# Patient Record
Sex: FEMALE | Race: WHITE | NOT HISPANIC OR LATINO
[De-identification: names, ages, dates, MRNs, and addresses within clinical notes are randomized per-mention and may not be internally consistent; named-entity substitution may affect disease eponyms.]

---

## 2023-10-06 PROBLEM — Z00.00 ENCOUNTER FOR PREVENTIVE HEALTH EXAMINATION: Status: ACTIVE | Noted: 2023-10-06

## 2023-10-19 ENCOUNTER — APPOINTMENT (OUTPATIENT)
Dept: ANTEPARTUM | Facility: CLINIC | Age: 33
End: 2023-10-19

## 2024-03-29 ENCOUNTER — APPOINTMENT (OUTPATIENT)
Dept: ANTEPARTUM | Facility: CLINIC | Age: 34
End: 2024-03-29
Payer: COMMERCIAL

## 2024-03-29 ENCOUNTER — ASOB RESULT (OUTPATIENT)
Age: 34
End: 2024-03-29

## 2024-03-29 PROCEDURE — 76816 OB US FOLLOW-UP PER FETUS: CPT

## 2024-03-29 PROCEDURE — 76819 FETAL BIOPHYS PROFIL W/O NST: CPT

## 2024-03-29 PROCEDURE — 76820 UMBILICAL ARTERY ECHO: CPT | Mod: 59

## 2024-04-24 ENCOUNTER — ASOB RESULT (OUTPATIENT)
Age: 34
End: 2024-04-24

## 2024-04-24 ENCOUNTER — APPOINTMENT (OUTPATIENT)
Dept: ANTEPARTUM | Facility: CLINIC | Age: 34
End: 2024-04-24
Payer: COMMERCIAL

## 2024-04-24 PROCEDURE — 76816 OB US FOLLOW-UP PER FETUS: CPT

## 2024-04-24 PROCEDURE — 76819 FETAL BIOPHYS PROFIL W/O NST: CPT

## 2024-04-24 PROCEDURE — 76820 UMBILICAL ARTERY ECHO: CPT | Mod: 59

## 2024-05-08 ENCOUNTER — TRANSCRIPTION ENCOUNTER (OUTPATIENT)
Age: 34
End: 2024-05-08

## 2024-05-18 ENCOUNTER — OUTPATIENT (OUTPATIENT)
Dept: OUTPATIENT SERVICES | Facility: HOSPITAL | Age: 34
LOS: 1 days | End: 2024-05-18
Payer: COMMERCIAL

## 2024-05-18 DIAGNOSIS — Z01.818 ENCOUNTER FOR OTHER PREPROCEDURAL EXAMINATION: ICD-10-CM

## 2024-05-18 PROCEDURE — 85027 COMPLETE CBC AUTOMATED: CPT

## 2024-05-18 PROCEDURE — 85610 PROTHROMBIN TIME: CPT

## 2024-05-18 PROCEDURE — 85730 THROMBOPLASTIN TIME PARTIAL: CPT

## 2024-05-18 PROCEDURE — 86780 TREPONEMA PALLIDUM: CPT

## 2024-05-19 ENCOUNTER — TRANSCRIPTION ENCOUNTER (OUTPATIENT)
Age: 34
End: 2024-05-19

## 2024-05-19 LAB
APTT BLD: 28.3 SEC — SIGNIFICANT CHANGE UP (ref 24.5–35.6)
HCT VFR BLD CALC: 34.2 % — LOW (ref 34.5–45)
HGB BLD-MCNC: 11 G/DL — LOW (ref 11.5–15.5)
INR BLD: 0.93 RATIO — SIGNIFICANT CHANGE UP (ref 0.85–1.18)
MCHC RBC-ENTMCNC: 29.8 PG — SIGNIFICANT CHANGE UP (ref 27–34)
MCHC RBC-ENTMCNC: 32.2 GM/DL — SIGNIFICANT CHANGE UP (ref 32–36)
MCV RBC AUTO: 92.7 FL — SIGNIFICANT CHANGE UP (ref 80–100)
PLATELET # BLD AUTO: 320 K/UL — SIGNIFICANT CHANGE UP (ref 150–400)
PROTHROM AB SERPL-ACNC: 10.5 SEC — SIGNIFICANT CHANGE UP (ref 9.5–13)
RBC # BLD: 3.69 M/UL — LOW (ref 3.8–5.2)
RBC # FLD: 13.8 % — SIGNIFICANT CHANGE UP (ref 10.3–14.5)
T PALLIDUM AB TITR SER: NEGATIVE — SIGNIFICANT CHANGE UP
WBC # BLD: 9.45 K/UL — SIGNIFICANT CHANGE UP (ref 3.8–10.5)
WBC # FLD AUTO: 9.45 K/UL — SIGNIFICANT CHANGE UP (ref 3.8–10.5)

## 2024-05-20 ENCOUNTER — INPATIENT (INPATIENT)
Facility: HOSPITAL | Age: 34
LOS: 2 days | Discharge: HOME | End: 2024-05-23
Attending: OBSTETRICS & GYNECOLOGY | Admitting: OBSTETRICS & GYNECOLOGY
Payer: COMMERCIAL

## 2024-05-20 VITALS
SYSTOLIC BLOOD PRESSURE: 108 MMHG | WEIGHT: 197.09 LBS | RESPIRATION RATE: 18 BRPM | HEIGHT: 62 IN | OXYGEN SATURATION: 100 % | TEMPERATURE: 98 F | HEART RATE: 104 BPM | DIASTOLIC BLOOD PRESSURE: 70 MMHG

## 2024-05-20 DIAGNOSIS — Z90.89 ACQUIRED ABSENCE OF OTHER ORGANS: Chronic | ICD-10-CM

## 2024-05-20 DIAGNOSIS — K08.409 PARTIAL LOSS OF TEETH, UNSPECIFIED CAUSE, UNSPECIFIED CLASS: Chronic | ICD-10-CM

## 2024-05-20 LAB
BLD GP AB SCN SERPL QL: NEGATIVE — SIGNIFICANT CHANGE UP
RH IG SCN BLD-IMP: POSITIVE — SIGNIFICANT CHANGE UP

## 2024-05-20 PROCEDURE — 88307 TISSUE EXAM BY PATHOLOGIST: CPT | Mod: 26

## 2024-05-20 DEVICE — SEPRAFILM 5 X 6": Type: IMPLANTABLE DEVICE | Status: FUNCTIONAL

## 2024-05-20 RX ORDER — PROPRANOLOL HCL 160 MG
1 CAPSULE, EXTENDED RELEASE 24HR ORAL
Refills: 0 | DISCHARGE

## 2024-05-20 RX ORDER — IBUPROFEN 200 MG
600 TABLET ORAL EVERY 6 HOURS
Refills: 0 | Status: COMPLETED | OUTPATIENT
Start: 2024-05-20 | End: 2025-04-18

## 2024-05-20 RX ORDER — OXYCODONE HYDROCHLORIDE 5 MG/1
5 TABLET ORAL
Refills: 0 | Status: COMPLETED | OUTPATIENT
Start: 2024-05-20 | End: 2024-05-27

## 2024-05-20 RX ORDER — ONDANSETRON 8 MG/1
4 TABLET, FILM COATED ORAL EVERY 6 HOURS
Refills: 0 | Status: DISCONTINUED | OUTPATIENT
Start: 2024-05-20 | End: 2024-05-20

## 2024-05-20 RX ORDER — ZINC OXIDE 200 MG/G
1 OINTMENT TOPICAL EVERY 6 HOURS
Refills: 0 | Status: DISCONTINUED | OUTPATIENT
Start: 2024-05-20 | End: 2024-05-23

## 2024-05-20 RX ORDER — TETANUS TOXOID, REDUCED DIPHTHERIA TOXOID AND ACELLULAR PERTUSSIS VACCINE, ADSORBED 5; 2.5; 8; 8; 2.5 [IU]/.5ML; [IU]/.5ML; UG/.5ML; UG/.5ML; UG/.5ML
0.5 SUSPENSION INTRAMUSCULAR ONCE
Refills: 0 | Status: DISCONTINUED | OUTPATIENT
Start: 2024-05-20 | End: 2024-05-23

## 2024-05-20 RX ORDER — OXYTOCIN 10 UNIT/ML
333.33 VIAL (ML) INJECTION
Qty: 20 | Refills: 0 | Status: DISCONTINUED | OUTPATIENT
Start: 2024-05-20 | End: 2024-05-20

## 2024-05-20 RX ORDER — SIMETHICONE 80 MG/1
80 TABLET, CHEWABLE ORAL EVERY 4 HOURS
Refills: 0 | Status: DISCONTINUED | OUTPATIENT
Start: 2024-05-20 | End: 2024-05-23

## 2024-05-20 RX ORDER — SODIUM CHLORIDE 9 MG/ML
1000 INJECTION, SOLUTION INTRAVENOUS
Refills: 0 | Status: DISCONTINUED | OUTPATIENT
Start: 2024-05-20 | End: 2024-05-20

## 2024-05-20 RX ORDER — LANOLIN
1 OINTMENT (GRAM) TOPICAL EVERY 6 HOURS
Refills: 0 | Status: DISCONTINUED | OUTPATIENT
Start: 2024-05-20 | End: 2024-05-23

## 2024-05-20 RX ORDER — DIPHENHYDRAMINE HCL 50 MG
25 CAPSULE ORAL EVERY 6 HOURS
Refills: 0 | Status: COMPLETED | OUTPATIENT
Start: 2024-05-20 | End: 2025-04-18

## 2024-05-20 RX ORDER — DEXAMETHASONE 0.5 MG/5ML
4 ELIXIR ORAL EVERY 6 HOURS
Refills: 0 | Status: DISCONTINUED | OUTPATIENT
Start: 2024-05-20 | End: 2024-05-20

## 2024-05-20 RX ORDER — CHLORHEXIDINE GLUCONATE 213 G/1000ML
1 SOLUTION TOPICAL DAILY
Refills: 0 | Status: DISCONTINUED | OUTPATIENT
Start: 2024-05-20 | End: 2024-05-20

## 2024-05-20 RX ORDER — NALOXONE HYDROCHLORIDE 4 MG/.1ML
0.1 SPRAY NASAL
Refills: 0 | Status: DISCONTINUED | OUTPATIENT
Start: 2024-05-20 | End: 2024-05-20

## 2024-05-20 RX ORDER — MAGNESIUM HYDROXIDE 400 MG/1
30 TABLET, CHEWABLE ORAL
Refills: 0 | Status: DISCONTINUED | OUTPATIENT
Start: 2024-05-20 | End: 2024-05-23

## 2024-05-20 RX ORDER — ACETAMINOPHEN 500 MG
975 TABLET ORAL
Refills: 0 | Status: DISCONTINUED | OUTPATIENT
Start: 2024-05-20 | End: 2024-05-23

## 2024-05-20 RX ORDER — FAMOTIDINE 10 MG/ML
20 INJECTION INTRAVENOUS ONCE
Refills: 0 | Status: DISCONTINUED | OUTPATIENT
Start: 2024-05-20 | End: 2024-05-20

## 2024-05-20 RX ORDER — DIPHENHYDRAMINE HCL 50 MG
25 CAPSULE ORAL EVERY 6 HOURS
Refills: 0 | Status: DISCONTINUED | OUTPATIENT
Start: 2024-05-20 | End: 2024-05-23

## 2024-05-20 RX ORDER — SODIUM CHLORIDE 9 MG/ML
1000 INJECTION, SOLUTION INTRAVENOUS
Refills: 0 | Status: DISCONTINUED | OUTPATIENT
Start: 2024-05-20 | End: 2024-05-23

## 2024-05-20 RX ORDER — KETOROLAC TROMETHAMINE 30 MG/ML
30 SYRINGE (ML) INJECTION EVERY 6 HOURS
Refills: 0 | Status: DISCONTINUED | OUTPATIENT
Start: 2024-05-20 | End: 2024-05-21

## 2024-05-20 RX ORDER — OXYTOCIN 10 UNIT/ML
333.33 VIAL (ML) INJECTION
Qty: 20 | Refills: 0 | Status: DISCONTINUED | OUTPATIENT
Start: 2024-05-20 | End: 2024-05-23

## 2024-05-20 RX ORDER — CITRIC ACID/SODIUM CITRATE 300-500 MG
30 SOLUTION, ORAL ORAL ONCE
Refills: 0 | Status: DISCONTINUED | OUTPATIENT
Start: 2024-05-20 | End: 2024-05-20

## 2024-05-20 RX ORDER — OXYCODONE HYDROCHLORIDE 5 MG/1
5 TABLET ORAL ONCE
Refills: 0 | Status: DISCONTINUED | OUTPATIENT
Start: 2024-05-20 | End: 2024-05-23

## 2024-05-20 RX ORDER — ACETAMINOPHEN 500 MG
1000 TABLET ORAL ONCE
Refills: 0 | Status: COMPLETED | OUTPATIENT
Start: 2024-05-20 | End: 2024-05-20

## 2024-05-20 RX ADMIN — SODIUM CHLORIDE 200 MILLILITER(S): 9 INJECTION, SOLUTION INTRAVENOUS at 16:24

## 2024-05-20 RX ADMIN — Medication 975 MILLIGRAM(S): at 21:00

## 2024-05-20 RX ADMIN — Medication 30 MILLILITER(S): at 12:30

## 2024-05-20 RX ADMIN — Medication 30 MILLIGRAM(S): at 23:09

## 2024-05-20 RX ADMIN — CHLORHEXIDINE GLUCONATE 1 APPLICATION(S): 213 SOLUTION TOPICAL at 12:00

## 2024-05-20 RX ADMIN — Medication 400 MILLIGRAM(S): at 15:25

## 2024-05-20 RX ADMIN — Medication 25 MILLIGRAM(S): at 21:24

## 2024-05-20 RX ADMIN — Medication 30 MILLIGRAM(S): at 16:00

## 2024-05-20 RX ADMIN — FAMOTIDINE 20 MILLIGRAM(S): 10 INJECTION INTRAVENOUS at 12:30

## 2024-05-20 NOTE — OB RN DELIVERY SUMMARY - NS_INTRAPARTUMABXTYPE_OBGYN_ALL_OB
Broad spectrum antibiotics 2-3.9 hrs prior to birth Broad spectrum antibiotics > 4 hrs prior to birth

## 2024-05-20 NOTE — OB RN INTRAOPERATIVE NOTE - NSSELHIDDEN_OBGYN_ALL_OB_FT
[NS_DeliveryAttending1_OBGYN_ALL_OB_FT:[id number]==],[NS_DeliveryAttending2_OBGYN_ALL_OB_FT:[id number]==] [NS_DeliveryAttending1_OBGYN_ALL_OB_FT:[id number]==],[NS_DeliveryAttending2_OBGYN_ALL_OB_FT:[id number]==],[NS_DeliveryRN_OBGYN_ALL_OB_FT:[id number]=]

## 2024-05-20 NOTE — PRE-ANESTHESIA EVALUATION ADULT - NSANTHPMHFT_GEN_ALL_CORE
32yo F  presenting at 39 weeks gestation for primary elective  section due to fetal macrosomia. PMH s/f anemia on PO iron supplements (starting Hgb 11), migraines of increasing frequency in pregnancy (Rx propranolol), and PCOS. 32yo F  presenting at 39 weeks gestation for primary elective  section due to fetal macrosomia. PMH s/f anemia on PO iron supplements (starting Hgb 11), migraines of increasing frequency in pregnancy (Rx propranolol), bilateral carpal tunnel with left hand intermittent numbness > R, and PCOS.

## 2024-05-20 NOTE — OB PROVIDER H&P - HISTORY OF PRESENT ILLNESS
33y yo  at 39+0 presenting for scheduled C/S for suspected macrosomia.  Denies ctx, LOF, VB. +FM    Ante: Spontaneous pregnancy. No elevated BPs. Passed GCT. GBS.    NIPS and anatomy scans wnl. EFW    OBHx:  G1- current    GynHx: PCOS. denies fibroids, ovarian cysts, abnormal paps, STIs    PMH: migraines  PSH: none  Meds: propranolol   NKDA    PE  T(C): --  HR: 104 (24 @ 10:54) (104 - 104)  BP: 108/70 (- @ 10:54) (108/70 - 108/70)  RR: 18 (24 @ 10:54) (18 - 18)  SpO2: --  Gen: well appearing  Abd: soft, nontender, gravid  TAUS: cephalic    FHT: baseline 130, moderate variability, + accels, no decels  TOCO: 2 in 10 min    A/P  33y yo  at 39+0 presenting for scheduled C/S for suspected macrosomia.  -admit to L&D  -consented for C/S  -IVF+NPO  -continuous fetal monitoring until OR  -Ancef 2g  -anesthesia consult    MD Farhad PGY3  D/W   33y yo  at 39+0 presenting for scheduled C/S for suspected macrosomia.  Denies ctx, LOF, VB. +FM    Ante: Spontaneous pregnancy. Vanishing twin in early pregnancy. No elevated BPs. Passed GCT. GBS negative.    NIPS and anatomy scans wnl. EFW 4000 g.    OBHx:  G1- current    GynHx: PCOS. denies fibroids, ovarian cysts, abnormal paps, STIs    PMH: migraines  PSH: T&A  Meds: propranolol 40 mg qd, PNV  NKDA    PE  T(C): --  HR: 104 (24 @ 10:54) (104 - 104)  BP: 108/70 (24 @ 10:54) (108/70 - 108/70)  RR: 18 (24 @ 10:54) (18 - 18)  SpO2: --  Gen: well appearing  Abd: soft, nontender, gravid  TAUS: cephalic    FHT: baseline 130, moderate variability, + accels, no decels  TOCO: 2 in 10 min    A/P  33y yo  at 39+0 presenting for scheduled C/S for suspected macrosomia.  -admit to L&D  -consented for C/S  -IVF+NPO  -continuous fetal monitoring until OR  -Ancef 2g  -anesthesia consult    MD Farhad PGY3  D/W Dr. Stiles

## 2024-05-20 NOTE — OB PROVIDER H&P - NSLOWPPHRISK_OBGYN_A_OB
No previous uterine incision/[place] Pregnancy/Less than or equal to 4 previous vaginal births/No known bleeding disorder/No history of postpartum hemorrhage/No other PPH risks indicated

## 2024-05-20 NOTE — OB PROVIDER H&P - NSMODPPHRISK_OBGYN_A_OB
Labs soon  RV after tests results Over-distended uterus: Multiple gestation, polyhydramnios, Macrosomia

## 2024-05-20 NOTE — OB RN DELIVERY SUMMARY - NS_RNDELIVATTEST_OBGYN_ALL_OB
none
OB Provider reported that the vagina was inspected and no foreign body was found/Laps, needles and instrument count was correct

## 2024-05-20 NOTE — PRE-ANESTHESIA EVALUATION ADULT - NSPROPOSEDPROCEDFT_GEN_ALL_CORE
Anticoagulation Progress Note      Comment:MD INR, spoke with patient reviewed dose and patient said she has been following correctly  Patient will take 5mg tonight then resume usual dose  INR in one week       Assessment  Yes No   []  [] Missed doses:   []  [x] Extra doses:   []  [x] Significant medication changes (RX, OTC, Herbal):   []  [] High-risk maintenance medications:                []  [] Vitamin K / dietary changes (Vitamin K goal: ___ cups/week):   []  [] Bleeding / bruising:   []  [] Falls / injury:   []  [] Acute illness:    []  [] Alcohol intake:   []  [] Procedures / hospitalization / ER visits:   []  [] Other:    Plan     Anticoagulation Summary  As of 5/2/2022    INR goal:     TTR:  64.3 % (7.3 mo)   Prior goal:  2.5-3.5   INR used for dosing:  3.5 (5/2/2022)   Warfarin maintenance plan:  5 mg (5 mg x 1) every Clemens, W, F; 7.5 mg (5 mg x 1.5) all other days   Weekly warfarin total:  45 mg   Plan last modified:  Mami Andujar RN (4/11/2022)   Next INR check:  5/9/2022   Target end date:      Indications    Paroxysmal atrial fibrillation (CMS/HCC) [I48.0]  Long term (current) use of anticoagulants (Resolved) [Z79.01]  Dilated cardiomyopathy (CMS/HCC) [I42.0]             Anticoagulation Episode Summary     INR check location:      Preferred lab:      Send INR reminders to:  [facility] [place] [address] [address] 400 POOL    Comments:  MD INR, call patient every time ..........................INR RANGE 2.5 to 3.0  service to anticoag and INR 9/24/21      Anticoagulation Care Providers     Provider Role Specialty Phone number    Alyson Flowers MD  Internal Medicine Advanced Heart Failure and Transplant Cardiology [phone]                Elective primary  section

## 2024-05-20 NOTE — OB RN DELIVERY SUMMARY - NSSELHIDDEN_OBGYN_ALL_OB_FT
[NS_DeliveryAttending1_OBGYN_ALL_OB_FT:[id number]==],[NS_DeliveryAttending2_OBGYN_ALL_OB_FT:[id number]==],[NS_DeliveryRN_OBGYN_ALL_OB_FT:[id number]=]

## 2024-05-20 NOTE — PRE-ANESTHESIA EVALUATION ADULT - NSANTHADDINFOFT_GEN_ALL_CORE
Discussed risks of general anesthesia, sedation, spinal, and epidural - including risks of cardiopulmonary instability, nerve injury, and spinal headache. Patient safety was emphasized, all questions were answered, and the patient elects to proceed. Discussed risks of general anesthesia, sedation, peripheral nerve block, spinal, and epidural - including risks of cardiopulmonary instability, nerve injury, and spinal headache. Patient safety was emphasized, all questions were answered, and the patient elects to proceed.

## 2024-05-21 LAB
BASOPHILS # BLD AUTO: 0.04 K/UL — SIGNIFICANT CHANGE UP (ref 0–0.2)
BASOPHILS NFR BLD AUTO: 0.3 % — SIGNIFICANT CHANGE UP (ref 0–2)
EOSINOPHIL # BLD AUTO: 0.03 K/UL — SIGNIFICANT CHANGE UP (ref 0–0.5)
EOSINOPHIL NFR BLD AUTO: 0.2 % — SIGNIFICANT CHANGE UP (ref 0–6)
HCT VFR BLD CALC: 27.1 % — LOW (ref 34.5–45)
HGB BLD-MCNC: 9.1 G/DL — LOW (ref 11.5–15.5)
IMM GRANULOCYTES NFR BLD AUTO: 0.5 % — SIGNIFICANT CHANGE UP (ref 0–0.9)
LYMPHOCYTES # BLD AUTO: 17 % — SIGNIFICANT CHANGE UP (ref 13–44)
LYMPHOCYTES # BLD AUTO: 2.08 K/UL — SIGNIFICANT CHANGE UP (ref 1–3.3)
MCHC RBC-ENTMCNC: 30.4 PG — SIGNIFICANT CHANGE UP (ref 27–34)
MCHC RBC-ENTMCNC: 33.6 GM/DL — SIGNIFICANT CHANGE UP (ref 32–36)
MCV RBC AUTO: 90.6 FL — SIGNIFICANT CHANGE UP (ref 80–100)
MONOCYTES # BLD AUTO: 0.89 K/UL — SIGNIFICANT CHANGE UP (ref 0–0.9)
MONOCYTES NFR BLD AUTO: 7.3 % — SIGNIFICANT CHANGE UP (ref 2–14)
NEUTROPHILS # BLD AUTO: 9.17 K/UL — HIGH (ref 1.8–7.4)
NEUTROPHILS NFR BLD AUTO: 74.7 % — SIGNIFICANT CHANGE UP (ref 43–77)
NRBC # BLD: 0 /100 WBCS — SIGNIFICANT CHANGE UP (ref 0–0)
PLATELET # BLD AUTO: 233 K/UL — SIGNIFICANT CHANGE UP (ref 150–400)
RBC # BLD: 2.99 M/UL — LOW (ref 3.8–5.2)
RBC # FLD: 13.2 % — SIGNIFICANT CHANGE UP (ref 10.3–14.5)
WBC # BLD: 12.27 K/UL — HIGH (ref 3.8–10.5)
WBC # FLD AUTO: 12.27 K/UL — HIGH (ref 3.8–10.5)

## 2024-05-21 RX ORDER — IBUPROFEN 200 MG
600 TABLET ORAL EVERY 6 HOURS
Refills: 0 | Status: DISCONTINUED | OUTPATIENT
Start: 2024-05-21 | End: 2024-05-23

## 2024-05-21 RX ORDER — OXYCODONE HYDROCHLORIDE 5 MG/1
5 TABLET ORAL
Refills: 0 | Status: DISCONTINUED | OUTPATIENT
Start: 2024-05-21 | End: 2024-05-23

## 2024-05-21 RX ADMIN — ZINC OXIDE 1 APPLICATION(S): 200 OINTMENT TOPICAL at 12:11

## 2024-05-21 RX ADMIN — Medication 975 MILLIGRAM(S): at 09:18

## 2024-05-21 RX ADMIN — Medication 975 MILLIGRAM(S): at 03:00

## 2024-05-21 RX ADMIN — Medication 975 MILLIGRAM(S): at 02:05

## 2024-05-21 RX ADMIN — OXYCODONE HYDROCHLORIDE 5 MILLIGRAM(S): 5 TABLET ORAL at 12:05

## 2024-05-21 RX ADMIN — OXYCODONE HYDROCHLORIDE 5 MILLIGRAM(S): 5 TABLET ORAL at 20:06

## 2024-05-21 RX ADMIN — Medication 975 MILLIGRAM(S): at 14:51

## 2024-05-21 RX ADMIN — OXYCODONE HYDROCHLORIDE 5 MILLIGRAM(S): 5 TABLET ORAL at 22:49

## 2024-05-21 RX ADMIN — OXYCODONE HYDROCHLORIDE 5 MILLIGRAM(S): 5 TABLET ORAL at 10:33

## 2024-05-21 RX ADMIN — OXYCODONE HYDROCHLORIDE 5 MILLIGRAM(S): 5 TABLET ORAL at 23:19

## 2024-05-21 RX ADMIN — Medication 30 MILLIGRAM(S): at 06:05

## 2024-05-21 RX ADMIN — Medication 975 MILLIGRAM(S): at 21:16

## 2024-05-21 RX ADMIN — OXYCODONE HYDROCHLORIDE 5 MILLIGRAM(S): 5 TABLET ORAL at 19:36

## 2024-05-21 RX ADMIN — Medication 600 MILLIGRAM(S): at 17:00

## 2024-05-21 RX ADMIN — Medication 600 MILLIGRAM(S): at 23:26

## 2024-05-21 RX ADMIN — Medication 30 MILLIGRAM(S): at 12:11

## 2024-05-21 NOTE — OB PROVIDER DELIVERY SUMMARY - NSPROVIDERDELIVERYNOTE_OBGYN_ALL_OB_FT
low flap csection  vacuum assist large head-felt assist would decrease risk of extension-easy delivery-no extensions apgar 9/9/ delayed clamping baby vigorous

## 2024-05-21 NOTE — OB PROVIDER DELIVERY SUMMARY - NSSELHIDDEN_OBGYN_ALL_OB_FT
[NS_DeliveryAttending1_OBGYN_ALL_OB_FT:[id number]==],[NS_DeliveryAttending2_OBGYN_ALL_OB_FT:[id number]==],[NS_DeliveryRN_OBGYN_ALL_OB_FT:[id number]=]

## 2024-05-21 NOTE — LACTATION INITIAL EVALUATION - LACTATION INTERVENTIONS
Responded to ptEvelia Bailey RN    
initiate/review safe skin-to-skin/initiate/review hand expression/reverse pressure softening/initiate/review techniques for position and latch/post discharge community resources provided/initiate/review breast massage/compression/reviewed components of an effective feeding and at least 8 effective feedings per day required/reviewed importance of monitoring infant diapers, the breastfeeding log, and minimum output each day/reviewed benefits and recommendations for rooming in/reviewed feeding on demand/by cue at least 8 times a day/reviewed indications of inadequate milk transfer that would require supplementation

## 2024-05-22 ENCOUNTER — TRANSCRIPTION ENCOUNTER (OUTPATIENT)
Age: 34
End: 2024-05-22

## 2024-05-22 RX ORDER — IBUPROFEN 200 MG
1 TABLET ORAL
Qty: 0 | Refills: 0 | DISCHARGE
Start: 2024-05-22

## 2024-05-22 RX ORDER — ACETAMINOPHEN 500 MG
3 TABLET ORAL
Qty: 0 | Refills: 0 | DISCHARGE
Start: 2024-05-22

## 2024-05-22 RX ADMIN — OXYCODONE HYDROCHLORIDE 5 MILLIGRAM(S): 5 TABLET ORAL at 10:23

## 2024-05-22 RX ADMIN — Medication 975 MILLIGRAM(S): at 14:03

## 2024-05-22 RX ADMIN — Medication 975 MILLIGRAM(S): at 08:57

## 2024-05-22 RX ADMIN — OXYCODONE HYDROCHLORIDE 5 MILLIGRAM(S): 5 TABLET ORAL at 18:49

## 2024-05-22 RX ADMIN — OXYCODONE HYDROCHLORIDE 5 MILLIGRAM(S): 5 TABLET ORAL at 04:35

## 2024-05-22 RX ADMIN — OXYCODONE HYDROCHLORIDE 5 MILLIGRAM(S): 5 TABLET ORAL at 14:44

## 2024-05-22 RX ADMIN — OXYCODONE HYDROCHLORIDE 5 MILLIGRAM(S): 5 TABLET ORAL at 04:05

## 2024-05-22 RX ADMIN — Medication 600 MILLIGRAM(S): at 12:01

## 2024-05-22 RX ADMIN — Medication 975 MILLIGRAM(S): at 20:44

## 2024-05-22 RX ADMIN — OXYCODONE HYDROCHLORIDE 5 MILLIGRAM(S): 5 TABLET ORAL at 11:30

## 2024-05-22 RX ADMIN — OXYCODONE HYDROCHLORIDE 5 MILLIGRAM(S): 5 TABLET ORAL at 16:30

## 2024-05-22 RX ADMIN — Medication 600 MILLIGRAM(S): at 06:02

## 2024-05-22 RX ADMIN — Medication 600 MILLIGRAM(S): at 17:03

## 2024-05-22 RX ADMIN — OXYCODONE HYDROCHLORIDE 5 MILLIGRAM(S): 5 TABLET ORAL at 19:14

## 2024-05-22 RX ADMIN — Medication 975 MILLIGRAM(S): at 02:26

## 2024-05-22 NOTE — DISCHARGE NOTE OB - HOSPITAL COURSE
Patient underwent an uncomplicated  delivery for macrosomia. Uncomplicated postpartum course. On day of discharge, patient is meeting all post op milestones and all vitals are stable.

## 2024-05-22 NOTE — DISCHARGE NOTE OB - FALL HARM RISK - PT AGE POPULATION HIDDEN
Patient and parent acknowledge and state understanding of plan  Healthy diet and exercise   Immunizations-   -declines flu   Follow up as needed   Schedule fasting labs     Jaw/ear pain   Ibuprofen as needed  Heat or ice as needed   Follow up with dentist      Adult

## 2024-05-22 NOTE — DISCHARGE NOTE OB - CARE PLAN
Principal Discharge DX:	 delivery delivered  Assessment and plan of treatment:	 delivery, meeting all postpartum milestones.  Please follow-up with your OB doctor within 2 weeks.  You can resume a regular diet at home and may continue your prenatal vitamins as directed.  Please place nothing in the vagina for 6 weeks (no tampons, sex, douching, tub baths, swimming pools, etc).  If you have severe headaches and/or vision changes, heavy bleeding, or chest pain, please call your provider or go to the nearest Emergency Department.  Please call your OB with any signs of symptoms of infection including fever > 100.4 degrees, severe pain, redness/warmth/drainage from the incision,  malodorous vaginal discharge or heavy bleeding requiring more than 1-2 pads/hour. Please refrain from heavy lifting. Avoid soaking/bathing, you may shower normally. Make sure your incision stays dry after shower, avoid creams and ointments.  You can take Motrin 600mg orally every 6 hours  and tyelnol 650mg every 6-8 hours for pain as needed. Take oxycodone 5mg every 6 hours for severe pain as needed.   1

## 2024-05-22 NOTE — DISCHARGE NOTE OB - PATIENT PORTAL LINK FT
You can access the FollowMyHealth Patient Portal offered by [facility] by registering at the following website: [email or web address]. By joining [employer]’s FollowMyHealth portal, you will also be able to view your health information using other applications (apps) compatible with our system.

## 2024-05-22 NOTE — DISCHARGE NOTE OB - CARE PROVIDER_API CALL
Bob Stiles  Obstetrics and Gynecology  [address] [address]  Phone: [phone]  Fax: [phone]  Follow Up Time:

## 2024-05-22 NOTE — DISCHARGE NOTE OB - MEDICATION SUMMARY - MEDICATIONS TO TAKE
I will START or STAY ON the medications listed below when I get home from the hospital:    ibuprofen 600 mg oral tablet  -- 1 tab(s) by mouth every 6 hours  -- Indication: For Pain    acetaminophen 325 mg oral tablet  -- 3 tab(s) by mouth every 6 hours  -- Indication: For Pain    propranolol 40 mg oral tablet  -- 1 tab(s) orally  -- Indication: For cardiology

## 2024-05-22 NOTE — DISCHARGE NOTE OB - NS MD DC FALL RISK RISK
For information on Fall & Injury Prevention, visit: [email or web address] OR  [email or web address] OR  https://www.cdc.gov/steadi/patient.html

## 2024-05-23 VITALS
OXYGEN SATURATION: 99 % | TEMPERATURE: 99 F | RESPIRATION RATE: 18 BRPM | HEART RATE: 90 BPM | SYSTOLIC BLOOD PRESSURE: 117 MMHG | DIASTOLIC BLOOD PRESSURE: 75 MMHG

## 2024-05-23 PROCEDURE — 86850 RBC ANTIBODY SCREEN: CPT

## 2024-05-23 PROCEDURE — 88307 TISSUE EXAM BY PATHOLOGIST: CPT

## 2024-05-23 PROCEDURE — C1765: CPT

## 2024-05-23 PROCEDURE — 86901 BLOOD TYPING SEROLOGIC RH(D): CPT

## 2024-05-23 PROCEDURE — 59050 FETAL MONITOR W/REPORT: CPT

## 2024-05-23 PROCEDURE — 86900 BLOOD TYPING SEROLOGIC ABO: CPT

## 2024-05-23 PROCEDURE — 36415 COLL VENOUS BLD VENIPUNCTURE: CPT

## 2024-05-23 PROCEDURE — 85025 COMPLETE CBC W/AUTO DIFF WBC: CPT

## 2024-05-23 RX ADMIN — OXYCODONE HYDROCHLORIDE 5 MILLIGRAM(S): 5 TABLET ORAL at 09:00

## 2024-05-23 RX ADMIN — Medication 600 MILLIGRAM(S): at 06:06

## 2024-05-23 RX ADMIN — OXYCODONE HYDROCHLORIDE 5 MILLIGRAM(S): 5 TABLET ORAL at 08:19

## 2024-05-23 RX ADMIN — OXYCODONE HYDROCHLORIDE 5 MILLIGRAM(S): 5 TABLET ORAL at 14:24

## 2024-05-23 RX ADMIN — Medication 600 MILLIGRAM(S): at 00:08

## 2024-05-23 RX ADMIN — OXYCODONE HYDROCHLORIDE 5 MILLIGRAM(S): 5 TABLET ORAL at 00:40

## 2024-05-23 RX ADMIN — MAGNESIUM HYDROXIDE 30 MILLILITER(S): 400 TABLET, CHEWABLE ORAL at 00:39

## 2024-05-23 RX ADMIN — OXYCODONE HYDROCHLORIDE 5 MILLIGRAM(S): 5 TABLET ORAL at 15:10

## 2024-05-23 RX ADMIN — Medication 975 MILLIGRAM(S): at 10:47

## 2024-05-23 RX ADMIN — Medication 975 MILLIGRAM(S): at 03:22

## 2024-05-23 RX ADMIN — Medication 600 MILLIGRAM(S): at 12:17

## 2024-05-23 RX ADMIN — OXYCODONE HYDROCHLORIDE 5 MILLIGRAM(S): 5 TABLET ORAL at 01:30

## 2024-05-23 NOTE — PROGRESS NOTE ADULT - SUBJECTIVE AND OBJECTIVE BOX
Patient evaluated at bedside.   She reports pain is well controlled.  Soliz in place  No Flatus  Not OOB yet.    She denies HA, dizziness, CP, palpitations, SOB, n/v, or heavy vaginal bleeding.    Physical Exam:  Vital Signs Last 24 Hrs  T(C): 36.9 (21 May 2024 06:16), Max: 37.4 (20 May 2024 16:55)  T(F): 98.5 (21 May 2024 06:16), Max: 99.3 (20 May 2024 16:55)  HR: 67 (21 May 2024 06:16) (65 - 104)  BP: 108/68 (21 May 2024 06:16) (102/67 - 113/61)  BP(mean): 79 (20 May 2024 15:45) (76 - 80)  RR: 17 (21 May 2024 06:16) (17 - 18)  SpO2: 99% (21 May 2024 06:16) (97% - 100%)    Parameters below as of 20 May 2024 16:55  Patient On (Oxygen Delivery Method): room air        Gen: NAD  Abd: soft, nontender, nondistended, no rebound or guarding  Incision clean, dry and intact  uterus firm at midline  : lochia WNL  Extremities: no swelling or calf tenderness                          9.1    12.27 )-----------( 233      ( 21 May 2024 05:30 )             27.1               
Patient evaluated at bedside.   She reports pain is well controlled with OPM.  She has been ambulating without assistance, voiding spontaneously, passing gas, tolerating regular diet and is breastfeeding.    She denies HA, dizziness, CP, palpitations, SOB, n/v, or heavy vaginal bleeding. Diarrhea on POD2, no BM since but took milk of magnesia last night and is passing a lot of gas.    Physical Exam:  Vital Signs Last 24 Hrs  T(C): 36.9 (23 May 2024 01:34), Max: 37.3 (22 May 2024 18:00)  T(F): 98.5 (23 May 2024 01:34), Max: 99.1 (22 May 2024 18:00)  HR: 95 (23 May 2024 01:34) (85 - 95)  BP: 101/67 (23 May 2024 01:34) (92/54 - 104/70)  BP(mean): --  RR: 17 (23 May 2024 01:34) (17 - 18)  SpO2: 98% (23 May 2024 01:34) (98% - 100%)        Gen: NAD  Abd: soft, nontender, nondistended, no rebound or guarding  Incision clean, dry and intact  uterus firm at midline  : lochia WNL  Extremities: no swelling or calf tenderness                
Patient evaluated at bedside.   She reports pain is well controlled with OPM. She has had some soreness and difficulty walking but otherwise is doing well with the addition of oxycodone.  She has been ambulating without assistance, voiding spontaneously, passing gas, tolerating regular diet and is breastfeeding.    She denies HA, dizziness, CP, palpitations, SOB, n/v, or heavy vaginal bleeding.    Physical Exam:  Vital Signs Last 24 Hrs  T(C): 37.1 (22 May 2024 04:11), Max: 37.1 (21 May 2024 18:02)  T(F): 98.8 (22 May 2024 04:11), Max: 98.8 (21 May 2024 18:02)  HR: 74 (22 May 2024 04:11) (73 - 93)  BP: 112/72 (22 May 2024 04:11) (90/57 - 122/69)  BP(mean): --  RR: 18 (22 May 2024 04:11) (18 - 20)  SpO2: 99% (22 May 2024 04:11) (97% - 100%)    Parameters below as of 21 May 2024 13:34  Patient On (Oxygen Delivery Method): room air        Gen: NAD  Abd: soft, nontender, nondistended, no rebound or guarding  Incision clean, dry and intact  uterus firm at midline  : lochia WNL  Extremities: no swelling or calf tenderness                          9.1    12.27 )-----------( 233      ( 21 May 2024 05:30 )             27.1

## 2024-05-23 NOTE — PROGRESS NOTE ADULT - ASSESSMENT
33y Female POD#2    s/p pC/S for suspected macrosomia, Uncomplicated                                       - Neuro/Pain:  toradol atc, tylenol atc, oxy prn  - CV:  VS per routine  - Pulm: Encourage ISS & Ambulation  - GI: Advance as tolerated  - : Voiding spontaneously  - DVT ppx: SCDs, Lovenox 40mg QD  - Dispo: POD #2/3
33y Female POD#1    s/p pC/S for suspected macrosomia, Uncomplicated                                       - Neuro/Pain:  toradol atc, tylenol atc, oxy prn  - CV:  VS per routine, AM CBC pending  - Pulm: Encourage ISS & Ambulation  - GI: Advance as tolerated  - : Soliz in place, to be removed this morning, TOV this afternoon  - DVT ppx: SCDs, Lovenox 40mg QD  - Dispo: POD #2/3
33y Female POD#3    s/p pC/S for suspected macrosomia, Uncomplicated                                       - Neuro/Pain:  toradol atc, tylenol atc, oxy prn  - CV:  VS per routine  - Pulm: Encourage ISS & Ambulation  - GI: Advance as tolerated  - : Voiding spontaneously  - DVT ppx: SCDs, Lovenox 40mg QD  - Dispo: POD #3

## 2024-05-24 LAB — SURGICAL PATHOLOGY STUDY: SIGNIFICANT CHANGE UP

## 2024-05-29 DIAGNOSIS — Z71.85 ENCOUNTER FOR IMMUNIZATION SAFETY COUNSELING: ICD-10-CM

## 2024-05-29 DIAGNOSIS — Z28.09 IMMUNIZATION NOT CARRIED OUT BECAUSE OF OTHER CONTRAINDICATION: ICD-10-CM

## 2024-05-29 DIAGNOSIS — Z3A.39 39 WEEKS GESTATION OF PREGNANCY: ICD-10-CM

## 2024-05-29 DIAGNOSIS — Z91.018 ALLERGY TO OTHER FOODS: ICD-10-CM

## 2024-05-29 DIAGNOSIS — E28.2 POLYCYSTIC OVARIAN SYNDROME: ICD-10-CM

## 2024-05-29 DIAGNOSIS — G56.03 CARPAL TUNNEL SYNDROME, BILATERAL UPPER LIMBS: ICD-10-CM

## 2024-05-29 DIAGNOSIS — G43.909 MIGRAINE, UNSPECIFIED, NOT INTRACTABLE, WITHOUT STATUS MIGRAINOSUS: ICD-10-CM

## 2024-05-29 DIAGNOSIS — O36.63X0 MATERNAL CARE FOR EXCESSIVE FETAL GROWTH, THIRD TRIMESTER, NOT APPLICABLE OR UNSPECIFIED: ICD-10-CM

## 2024-05-29 DIAGNOSIS — Z28.21 IMMUNIZATION NOT CARRIED OUT BECAUSE OF PATIENT REFUSAL: ICD-10-CM

## 2025-06-12 NOTE — OB RN DELIVERY SUMMARY - NS_PLACENTDISPOA_OBGYN_ALL_OB
Patient states he was advised by Dr. Justice to hold off on taking Lipitor for 2 weeks. Patient states he does not want to continue taking after the 2 weeks are done. Please advise.    Sent to Pathology